# Patient Record
Sex: FEMALE | Race: WHITE | Employment: OTHER | URBAN - METROPOLITAN AREA
[De-identification: names, ages, dates, MRNs, and addresses within clinical notes are randomized per-mention and may not be internally consistent; named-entity substitution may affect disease eponyms.]

---

## 2023-12-01 ENCOUNTER — OFFICE VISIT (OUTPATIENT)
Dept: UROLOGY | Age: 88
End: 2023-12-01
Payer: COMMERCIAL

## 2023-12-01 DIAGNOSIS — R82.81 PYURIA: ICD-10-CM

## 2023-12-01 DIAGNOSIS — N90.89 LABIAL ADHESIONS: Primary | ICD-10-CM

## 2023-12-01 PROCEDURE — 99204 OFFICE O/P NEW MOD 45 MIN: CPT | Performed by: UROLOGY

## 2023-12-01 PROCEDURE — 1123F ACP DISCUSS/DSCN MKR DOCD: CPT | Performed by: UROLOGY

## 2023-12-01 RX ORDER — PREDNISONE 1 MG/1
TABLET ORAL
COMMUNITY
Start: 2023-11-09

## 2023-12-01 RX ORDER — ESTRADIOL 0.1 MG/G
1 CREAM VAGINAL
COMMUNITY
Start: 2023-10-09

## 2023-12-01 RX ORDER — LEVOTHYROXINE SODIUM 0.05 MG/1
50 TABLET ORAL DAILY
COMMUNITY
Start: 2023-11-09

## 2023-12-01 RX ORDER — ATORVASTATIN CALCIUM 20 MG/1
10 TABLET, FILM COATED ORAL
COMMUNITY

## 2023-12-01 ASSESSMENT — ENCOUNTER SYMPTOMS
VOMITING: 0
EYE PAIN: 0
SHORTNESS OF BREATH: 0
SKIN LESIONS: 0
BLOOD IN STOOL: 0
DIARRHEA: 0
NAUSEA: 0
CONSTIPATION: 0
INDIGESTION: 0
HEARTBURN: 0
COUGH: 0
WHEEZING: 0
ABDOMINAL PAIN: 0
EYE DISCHARGE: 0
BACK PAIN: 0

## 2023-12-01 NOTE — PROGRESS NOTES
[facility]  [address]  [phone]    Lorenzo Love  : 1929    Chief Complaint   Patient presents with    New Patient        HPI     Lorenzo Love is a 80 y.o. female  Referred by Dr. Monserrat Rogel for evaluation and treatment of recurrent UTI. Has seen Dr. Tere Alcala for labial adhesions. Treated with estrogen cream. She doesn't want to have to travel to [facility] to see him. She states that she is having too many UTI's. Sxs are fatigue. , she denies dysuria, hematuria, pain or fever. Takes Prednisone 1 mg daily. UA from  and 10-23 show pos nit, large lorenza. No past medical history on file. No past surgical history on file. Current Outpatient Medications   Medication Sig Dispense Refill    estradiol (ESTRACE) 0.1 MG/GM vaginal cream Place 1 g vaginally      atorvastatin (LIPITOR) 20 MG tablet Take 0.5 tablets by mouth      levothyroxine (SYNTHROID) 50 MCG tablet Take 1 tablet by mouth daily      predniSONE (DELTASONE) 1 MG tablet TAKE 1 TABLET (1 MG) BY MOUTH DAILY TAKE 1 TO 2 TABLETS BY MOUTH WITH FOOD FOR ARTHRITIS       No current facility-administered medications for this visit. No Known Allergies  Social History     Socioeconomic History    Marital status:       Spouse name: Not on file    Number of children: Not on file    Years of education: Not on file    Highest education level: Not on file   Occupational History    Not on file   Tobacco Use    Smoking status: Not on file    Smokeless tobacco: Not on file   Substance and Sexual Activity    Alcohol use: Not on file    Drug use: Not on file    Sexual activity: Not on file   Other Topics Concern    Not on file   Social History Narrative    Not on file     Social Determinants of Health     Financial Resource Strain: Not on file   Food Insecurity: Not on file   Transportation Needs: Not on file   Physical Activity: Not on file   Stress: Not on file

## 2023-12-03 LAB
BACTERIA SPEC CULT: ABNORMAL
SERVICE CMNT-IMP: ABNORMAL

## 2023-12-05 ENCOUNTER — TELEPHONE (OUTPATIENT)
Dept: UROLOGY | Age: 88
End: 2023-12-05

## 2023-12-05 DIAGNOSIS — N39.0 RECURRENT UTI: Primary | ICD-10-CM

## 2023-12-05 LAB
BACTERIA SPEC CULT: ABNORMAL
SERVICE CMNT-IMP: ABNORMAL

## 2023-12-05 RX ORDER — NITROFURANTOIN 25; 75 MG/1; MG/1
100 CAPSULE ORAL 2 TIMES DAILY
Qty: 6 CAPSULE | Refills: 0 | Status: SHIPPED | OUTPATIENT
Start: 2023-12-05 | End: 2023-12-08

## 2023-12-05 NOTE — TELEPHONE ENCOUNTER
Had UTI   Diagnosis Orders   1.  Recurrent UTI  nitrofurantoin, macrocrystal-monohydrate, (MACROBID) 100 MG capsule        I eprescribed the med

## 2024-09-02 ENCOUNTER — HOSPITAL ENCOUNTER (EMERGENCY)
Age: 89
Discharge: HOME | End: 2024-09-02
Payer: MEDICARE

## 2024-09-02 VITALS
DIASTOLIC BLOOD PRESSURE: 61 MMHG | WEIGHT: 110 LBS | RESPIRATION RATE: 16 BRPM | TEMPERATURE: 98 F | OXYGEN SATURATION: 98 % | SYSTOLIC BLOOD PRESSURE: 168 MMHG | HEART RATE: 76 BPM

## 2024-09-02 DIAGNOSIS — S81.811S: Primary | ICD-10-CM

## 2024-09-02 PROCEDURE — 99282 EMERGENCY DEPT VISIT SF MDM: CPT

## 2024-09-02 ASSESSMENT — ENCOUNTER SYMPTOMS
EYES NEGATIVE: 1
ALLERGIC/IMMUNOLOGIC NEGATIVE: 1
GASTROINTESTINAL NEGATIVE: 1
RESPIRATORY NEGATIVE: 1

## 2024-09-02 ASSESSMENT — PAIN SCALES - GENERAL: PAINLEVEL_OUTOF10: 1

## 2024-09-02 ASSESSMENT — PAIN DESCRIPTION - LOCATION: LOCATION: LEG

## 2024-09-03 NOTE — ED PROVIDER NOTES
Emergency Department Provider Note       PCP: Adalberto Harrison III, MD   Age: 89 y.o.   Sex: female     DISPOSITION Decision To Discharge 09/02/2024 09:10:10 PM  Condition at Disposition: Good       ICD-10-CM    1. Noninfected skin tear of right leg, sequela  S81.811S           Medical Decision Making     In summary this is a well-appearing 89-year-old female who arrives for skin tear to the anterior right thigh that occurred prior to arrival.  Upon trying to approximate for possible Steri-Strip or suturing, patient's skin tore so feel that no closure approximation of skin is possible at this time.  I have placed a nonstick bulky dressing on at this time.  Wound care was discussed with patient and daughter at bedside in which they verbalized understanding.  Will have him follow-up with her PCP in a couple of weeks for reassessment and reevaluation of this.  Signs of infection were discussed as well as other complications in which they need to return.  They verbalized understanding of this.  Very strict return precautions were discussed with patient and daughter in which they verbalized understanding.     1 acute, uncomplicated illness or injury.  Patient was discharged risks and benefits of hospitalization were considered.  Shared medical decision making was utilized in creating the patients health plan today.    I independently ordered and reviewed each unique test.  I reviewed external records: ED visit note from an outside group.  I reviewed external records: provider visit note from PCP.    history provided by patient and daughter.                  History     This is a generally healthy appearing 89-year-old female arriving to the emergency department for skin tear to the anterior distal thigh that occurred prior to arrival.  Daughter states that she tripped over a dog and fell onto her mother.  Daughter reports that the skin tear was caused by her hand running up the thigh as patient did not hit it on

## 2024-09-03 NOTE — DISCHARGE INSTRUCTIONS
As we discussed, please keep wound clean and dry until tomorrow.  As we discussed, monitor for signs of infection.  Please perform dressing changes as we discussed.  Which you to follow-up with your primary care physician in a couple weeks for recheck and further recommendations if this is not healed completely.  As we discussed, please return to the emergency department for any new, worsening, concerning symptoms.

## 2024-09-03 NOTE — ED NOTES
Patient mobility status  with mild difficulty. Provider aware     I have reviewed discharge instructions with the patient.  The patient verbalized understanding.    Patient left ED via Discharge Method: ambulatory to Home with Child.    Opportunity for questions and clarification provided.     Patient given 0 scripts.

## 2024-11-14 ENCOUNTER — APPOINTMENT (OUTPATIENT)
Dept: GENERAL RADIOLOGY | Age: 89
End: 2024-11-14
Payer: MEDICARE

## 2024-11-14 ENCOUNTER — HOSPITAL ENCOUNTER (OUTPATIENT)
Age: 89
Setting detail: OBSERVATION
Discharge: HOME | End: 2024-11-16
Attending: EMERGENCY MEDICINE | Admitting: STUDENT IN AN ORGANIZED HEALTH CARE EDUCATION/TRAINING PROGRAM
Payer: MEDICARE

## 2024-11-14 DIAGNOSIS — I24.9 ACUTE CORONARY SYNDROME (HCC): ICD-10-CM

## 2024-11-14 DIAGNOSIS — I24.9 ACS (ACUTE CORONARY SYNDROME) (HCC): Primary | ICD-10-CM

## 2024-11-14 DIAGNOSIS — R79.89 ELEVATED TROPONIN: ICD-10-CM

## 2024-11-14 DIAGNOSIS — R55 SYNCOPE, UNSPECIFIED SYNCOPE TYPE: ICD-10-CM

## 2024-11-14 PROBLEM — Z51.5 ENCOUNTER FOR PALLIATIVE CARE: Status: ACTIVE | Noted: 2024-11-14

## 2024-11-14 PROBLEM — M19.90 OSTEOARTHRITIS: Status: ACTIVE | Noted: 2024-11-14

## 2024-11-14 PROBLEM — R07.9 CHEST PAIN: Status: ACTIVE | Noted: 2024-11-14

## 2024-11-14 PROBLEM — I21.4 NSTEMI (NON-ST ELEVATED MYOCARDIAL INFARCTION) (HCC): Status: ACTIVE | Noted: 2024-11-14

## 2024-11-14 PROBLEM — E03.9 HYPOTHYROIDISM: Status: ACTIVE | Noted: 2024-11-14

## 2024-11-14 PROBLEM — N18.9 CKD (CHRONIC KIDNEY DISEASE): Status: ACTIVE | Noted: 2024-11-14

## 2024-11-14 PROBLEM — Z87.39 HISTORY OF GIANT CELL ARTERITIS: Status: ACTIVE | Noted: 2024-11-14

## 2024-11-14 PROBLEM — Z87.39 HISTORY OF POLYMYALGIA RHEUMATICA: Status: ACTIVE | Noted: 2024-11-14

## 2024-11-14 PROBLEM — I25.10 CAD (CORONARY ARTERY DISEASE): Status: ACTIVE | Noted: 2024-11-14

## 2024-11-14 PROBLEM — M54.2 CHRONIC NECK PAIN: Status: ACTIVE | Noted: 2024-11-14

## 2024-11-14 PROBLEM — M81.0 OSTEOPOROSIS: Status: ACTIVE | Noted: 2024-11-14

## 2024-11-14 PROBLEM — Z95.5 S/P CORONARY ARTERY STENT PLACEMENT: Status: ACTIVE | Noted: 2024-11-14

## 2024-11-14 PROBLEM — G89.29 CHRONIC NECK PAIN: Status: ACTIVE | Noted: 2024-11-14

## 2024-11-14 LAB
ALBUMIN SERPL-MCNC: 3.6 G/DL (ref 3.2–4.6)
ALBUMIN/GLOB SERPL: 1 (ref 1–1.9)
ALP SERPL-CCNC: 105 U/L (ref 35–104)
ALT SERPL-CCNC: 10 U/L (ref 8–45)
ANION GAP SERPL CALC-SCNC: 15 MMOL/L (ref 7–16)
APTT PPP: 25 SEC (ref 23.3–37.4)
AST SERPL-CCNC: 25 U/L (ref 15–37)
BASOPHILS # BLD: 0 K/UL (ref 0–0.2)
BASOPHILS NFR BLD: 1 % (ref 0–2)
BILIRUB SERPL-MCNC: 0.4 MG/DL (ref 0–1.2)
BUN SERPL-MCNC: 23 MG/DL (ref 8–23)
CALCIUM SERPL-MCNC: 9.7 MG/DL (ref 8.8–10.2)
CHLORIDE SERPL-SCNC: 105 MMOL/L (ref 98–107)
CO2 SERPL-SCNC: 20 MMOL/L (ref 20–29)
CREAT SERPL-MCNC: 1.41 MG/DL (ref 0.6–1.1)
DIFFERENTIAL METHOD BLD: NORMAL
EOSINOPHIL # BLD: 0.1 K/UL (ref 0–0.8)
EOSINOPHIL NFR BLD: 1 % (ref 0.5–7.8)
ERYTHROCYTE [DISTWIDTH] IN BLOOD BY AUTOMATED COUNT: 13.2 % (ref 11.9–14.6)
GLOBULIN SER CALC-MCNC: 3.7 G/DL (ref 2.3–3.5)
GLUCOSE SERPL-MCNC: 109 MG/DL (ref 70–99)
HCT VFR BLD AUTO: 41.5 % (ref 35.8–46.3)
HGB BLD-MCNC: 13.7 G/DL (ref 11.7–15.4)
IMM GRANULOCYTES # BLD AUTO: 0 K/UL (ref 0–0.5)
IMM GRANULOCYTES NFR BLD AUTO: 0 % (ref 0–5)
INR PPP: 1
LYMPHOCYTES # BLD: 1.1 K/UL (ref 0.5–4.6)
LYMPHOCYTES NFR BLD: 14 % (ref 13–44)
MCH RBC QN AUTO: 30.5 PG (ref 26.1–32.9)
MCHC RBC AUTO-ENTMCNC: 33 G/DL (ref 31.4–35)
MCV RBC AUTO: 92.4 FL (ref 82–102)
MONOCYTES # BLD: 0.6 K/UL (ref 0.1–1.3)
MONOCYTES NFR BLD: 8 % (ref 4–12)
NEUTS SEG # BLD: 6.1 K/UL (ref 1.7–8.2)
NEUTS SEG NFR BLD: 76 % (ref 43–78)
NRBC # BLD: 0 K/UL (ref 0–0.2)
NT PRO BNP: 1424 PG/ML (ref 0–450)
PLATELET # BLD AUTO: 220 K/UL (ref 150–450)
PMV BLD AUTO: 12.3 FL (ref 9.4–12.3)
POTASSIUM SERPL-SCNC: 4.5 MMOL/L (ref 3.5–5.1)
PROT SERPL-MCNC: 7.3 G/DL (ref 6.3–8.2)
PROTHROMBIN TIME: 13.5 SEC (ref 11.3–14.9)
RBC # BLD AUTO: 4.49 M/UL (ref 4.05–5.2)
SODIUM SERPL-SCNC: 141 MMOL/L (ref 136–145)
TROPONIN T SERPL HS-MCNC: 175 NG/L (ref 0–14)
TROPONIN T SERPL HS-MCNC: 202 NG/L (ref 0–14)
UFH PPP CHRO-ACNC: <0.1 IU/ML (ref 0.3–0.7)
WBC # BLD AUTO: 8 K/UL (ref 4.3–11.1)

## 2024-11-14 PROCEDURE — 83880 ASSAY OF NATRIURETIC PEPTIDE: CPT

## 2024-11-14 PROCEDURE — 85520 HEPARIN ASSAY: CPT

## 2024-11-14 PROCEDURE — 2580000003 HC RX 258: Performed by: EMERGENCY MEDICINE

## 2024-11-14 PROCEDURE — 80053 COMPREHEN METABOLIC PANEL: CPT

## 2024-11-14 PROCEDURE — G0378 HOSPITAL OBSERVATION PER HR: HCPCS

## 2024-11-14 PROCEDURE — 99285 EMERGENCY DEPT VISIT HI MDM: CPT

## 2024-11-14 PROCEDURE — 96375 TX/PRO/DX INJ NEW DRUG ADDON: CPT

## 2024-11-14 PROCEDURE — 6370000000 HC RX 637 (ALT 250 FOR IP): Performed by: EMERGENCY MEDICINE

## 2024-11-14 PROCEDURE — 72050 X-RAY EXAM NECK SPINE 4/5VWS: CPT

## 2024-11-14 PROCEDURE — 96365 THER/PROPH/DIAG IV INF INIT: CPT

## 2024-11-14 PROCEDURE — 85730 THROMBOPLASTIN TIME PARTIAL: CPT

## 2024-11-14 PROCEDURE — 71045 X-RAY EXAM CHEST 1 VIEW: CPT

## 2024-11-14 PROCEDURE — 85610 PROTHROMBIN TIME: CPT

## 2024-11-14 PROCEDURE — 84484 ASSAY OF TROPONIN QUANT: CPT

## 2024-11-14 PROCEDURE — 6360000002 HC RX W HCPCS: Performed by: EMERGENCY MEDICINE

## 2024-11-14 PROCEDURE — 85025 COMPLETE CBC W/AUTO DIFF WBC: CPT

## 2024-11-14 PROCEDURE — 93005 ELECTROCARDIOGRAM TRACING: CPT | Performed by: EMERGENCY MEDICINE

## 2024-11-14 RX ORDER — ACETAMINOPHEN 650 MG/1
650 SUPPOSITORY RECTAL EVERY 6 HOURS PRN
Status: DISCONTINUED | OUTPATIENT
Start: 2024-11-14 | End: 2024-11-14 | Stop reason: SDUPTHER

## 2024-11-14 RX ORDER — ONDANSETRON 2 MG/ML
4 INJECTION INTRAMUSCULAR; INTRAVENOUS ONCE
Status: COMPLETED | OUTPATIENT
Start: 2024-11-14 | End: 2024-11-14

## 2024-11-14 RX ORDER — ACETAMINOPHEN 325 MG/1
650 TABLET ORAL EVERY 4 HOURS PRN
Status: DISCONTINUED | OUTPATIENT
Start: 2024-11-14 | End: 2024-11-16 | Stop reason: HOSPADM

## 2024-11-14 RX ORDER — HEPARIN SODIUM 10000 [USP'U]/100ML
5-30 INJECTION, SOLUTION INTRAVENOUS CONTINUOUS
Status: DISCONTINUED | OUTPATIENT
Start: 2024-11-14 | End: 2024-11-15

## 2024-11-14 RX ORDER — MAGNESIUM SULFATE IN WATER 40 MG/ML
2000 INJECTION, SOLUTION INTRAVENOUS PRN
Status: DISCONTINUED | OUTPATIENT
Start: 2024-11-14 | End: 2024-11-16 | Stop reason: HOSPADM

## 2024-11-14 RX ORDER — ENOXAPARIN SODIUM 100 MG/ML
30 INJECTION SUBCUTANEOUS DAILY
Status: CANCELLED | OUTPATIENT
Start: 2024-11-15

## 2024-11-14 RX ORDER — SODIUM CHLORIDE 9 MG/ML
INJECTION, SOLUTION INTRAVENOUS PRN
Status: DISCONTINUED | OUTPATIENT
Start: 2024-11-14 | End: 2024-11-16 | Stop reason: HOSPADM

## 2024-11-14 RX ORDER — HEPARIN SODIUM 1000 [USP'U]/ML
30 INJECTION, SOLUTION INTRAVENOUS; SUBCUTANEOUS PRN
Status: DISCONTINUED | OUTPATIENT
Start: 2024-11-14 | End: 2024-11-15

## 2024-11-14 RX ORDER — POLYETHYLENE GLYCOL 3350 17 G/17G
17 POWDER, FOR SOLUTION ORAL DAILY PRN
Status: DISCONTINUED | OUTPATIENT
Start: 2024-11-14 | End: 2024-11-16 | Stop reason: HOSPADM

## 2024-11-14 RX ORDER — ASPIRIN 81 MG/1
324 TABLET, CHEWABLE ORAL ONCE
Status: COMPLETED | OUTPATIENT
Start: 2024-11-14 | End: 2024-11-14

## 2024-11-14 RX ORDER — 0.9 % SODIUM CHLORIDE 0.9 %
500 INTRAVENOUS SOLUTION INTRAVENOUS ONCE
Status: COMPLETED | OUTPATIENT
Start: 2024-11-14 | End: 2024-11-14

## 2024-11-14 RX ORDER — POTASSIUM CHLORIDE 1500 MG/1
40 TABLET, EXTENDED RELEASE ORAL PRN
Status: DISCONTINUED | OUTPATIENT
Start: 2024-11-14 | End: 2024-11-16 | Stop reason: HOSPADM

## 2024-11-14 RX ORDER — SODIUM CHLORIDE 0.9 % (FLUSH) 0.9 %
5-40 SYRINGE (ML) INJECTION EVERY 12 HOURS SCHEDULED
Status: DISCONTINUED | OUTPATIENT
Start: 2024-11-14 | End: 2024-11-16 | Stop reason: HOSPADM

## 2024-11-14 RX ORDER — HEPARIN SODIUM 1000 [USP'U]/ML
60 INJECTION, SOLUTION INTRAVENOUS; SUBCUTANEOUS ONCE
Status: COMPLETED | OUTPATIENT
Start: 2024-11-14 | End: 2024-11-14

## 2024-11-14 RX ORDER — HEPARIN SODIUM 1000 [USP'U]/ML
60 INJECTION, SOLUTION INTRAVENOUS; SUBCUTANEOUS PRN
Status: DISCONTINUED | OUTPATIENT
Start: 2024-11-14 | End: 2024-11-15

## 2024-11-14 RX ORDER — SODIUM CHLORIDE 0.9 % (FLUSH) 0.9 %
5-40 SYRINGE (ML) INJECTION PRN
Status: DISCONTINUED | OUTPATIENT
Start: 2024-11-14 | End: 2024-11-16 | Stop reason: HOSPADM

## 2024-11-14 RX ORDER — ATORVASTATIN CALCIUM 10 MG/1
10 TABLET, FILM COATED ORAL NIGHTLY
Status: DISCONTINUED | OUTPATIENT
Start: 2024-11-14 | End: 2024-11-16 | Stop reason: HOSPADM

## 2024-11-14 RX ORDER — POTASSIUM CHLORIDE 7.45 MG/ML
10 INJECTION INTRAVENOUS PRN
Status: DISCONTINUED | OUTPATIENT
Start: 2024-11-14 | End: 2024-11-16 | Stop reason: HOSPADM

## 2024-11-14 RX ORDER — PREDNISONE 1 MG/1
1 TABLET ORAL DAILY
Status: DISCONTINUED | OUTPATIENT
Start: 2024-11-15 | End: 2024-11-16 | Stop reason: HOSPADM

## 2024-11-14 RX ORDER — ACETAMINOPHEN 325 MG/1
650 TABLET ORAL EVERY 6 HOURS PRN
Status: DISCONTINUED | OUTPATIENT
Start: 2024-11-14 | End: 2024-11-14 | Stop reason: SDUPTHER

## 2024-11-14 RX ORDER — ONDANSETRON 2 MG/ML
4 INJECTION INTRAMUSCULAR; INTRAVENOUS EVERY 6 HOURS PRN
Status: DISCONTINUED | OUTPATIENT
Start: 2024-11-14 | End: 2024-11-16 | Stop reason: HOSPADM

## 2024-11-14 RX ORDER — ONDANSETRON 4 MG/1
4 TABLET, ORALLY DISINTEGRATING ORAL EVERY 8 HOURS PRN
Status: DISCONTINUED | OUTPATIENT
Start: 2024-11-14 | End: 2024-11-16 | Stop reason: HOSPADM

## 2024-11-14 RX ORDER — HYDRALAZINE HYDROCHLORIDE 20 MG/ML
10 INJECTION INTRAMUSCULAR; INTRAVENOUS EVERY 6 HOURS PRN
Status: DISCONTINUED | OUTPATIENT
Start: 2024-11-14 | End: 2024-11-16 | Stop reason: HOSPADM

## 2024-11-14 RX ORDER — LEVOTHYROXINE SODIUM 50 UG/1
50 TABLET ORAL
Status: DISCONTINUED | OUTPATIENT
Start: 2024-11-15 | End: 2024-11-16 | Stop reason: HOSPADM

## 2024-11-14 RX ORDER — ACETAMINOPHEN 650 MG/1
650 SUPPOSITORY RECTAL EVERY 4 HOURS PRN
Status: DISCONTINUED | OUTPATIENT
Start: 2024-11-14 | End: 2024-11-16 | Stop reason: HOSPADM

## 2024-11-14 RX ADMIN — ASPIRIN 324 MG: 81 TABLET, CHEWABLE ORAL at 23:33

## 2024-11-14 RX ADMIN — ONDANSETRON 4 MG: 2 INJECTION INTRAMUSCULAR; INTRAVENOUS at 22:39

## 2024-11-14 RX ADMIN — PANTOPRAZOLE SODIUM 40 MG: 40 INJECTION, POWDER, FOR SOLUTION INTRAVENOUS at 22:39

## 2024-11-14 RX ADMIN — HEPARIN SODIUM 12 UNITS/KG/HR: 10000 INJECTION, SOLUTION INTRAVENOUS at 22:56

## 2024-11-14 RX ADMIN — HEPARIN SODIUM 3000 UNITS: 1000 INJECTION INTRAVENOUS; SUBCUTANEOUS at 22:38

## 2024-11-14 RX ADMIN — SODIUM CHLORIDE 500 ML: 9 INJECTION, SOLUTION INTRAVENOUS at 22:37

## 2024-11-14 ASSESSMENT — PAIN SCALES - GENERAL: PAINLEVEL_OUTOF10: 10

## 2024-11-14 ASSESSMENT — PAIN - FUNCTIONAL ASSESSMENT: PAIN_FUNCTIONAL_ASSESSMENT: 0-10

## 2024-11-15 ENCOUNTER — APPOINTMENT (OUTPATIENT)
Dept: NON INVASIVE DIAGNOSTICS | Age: 89
End: 2024-11-15
Attending: STUDENT IN AN ORGANIZED HEALTH CARE EDUCATION/TRAINING PROGRAM
Payer: MEDICARE

## 2024-11-15 PROBLEM — Z71.89 ACP (ADVANCE CARE PLANNING): Status: ACTIVE | Noted: 2024-11-15

## 2024-11-15 PROBLEM — R53.83 FATIGUE: Status: ACTIVE | Noted: 2024-11-15

## 2024-11-15 LAB
ALBUMIN SERPL-MCNC: 2.9 G/DL (ref 3.2–4.6)
ALBUMIN/GLOB SERPL: 1 (ref 1–1.9)
ALP SERPL-CCNC: 85 U/L (ref 35–104)
ALT SERPL-CCNC: 11 U/L (ref 8–45)
ANION GAP SERPL CALC-SCNC: 9 MMOL/L (ref 7–16)
APPEARANCE UR: ABNORMAL
AST SERPL-CCNC: 20 U/L (ref 15–37)
BACTERIA URNS QL MICRO: ABNORMAL /HPF
BASOPHILS # BLD: 0 K/UL (ref 0–0.2)
BASOPHILS NFR BLD: 1 % (ref 0–2)
BILIRUB SERPL-MCNC: 0.3 MG/DL (ref 0–1.2)
BILIRUB UR QL: NEGATIVE
BUN SERPL-MCNC: 25 MG/DL (ref 8–23)
CALCIUM SERPL-MCNC: 8.2 MG/DL (ref 8.8–10.2)
CHLORIDE SERPL-SCNC: 109 MMOL/L (ref 98–107)
CO2 SERPL-SCNC: 23 MMOL/L (ref 20–29)
COLOR UR: ABNORMAL
CREAT SERPL-MCNC: 1.59 MG/DL (ref 0.6–1.1)
DIFFERENTIAL METHOD BLD: ABNORMAL
ECHO AO ASC DIAM: 3.1 CM
ECHO AO ASCENDING AORTA INDEX: 2.04 CM/M2
ECHO AO ROOT DIAM: 3.2 CM
ECHO AO ROOT INDEX: 2.11 CM/M2
ECHO AR MAX VEL PISA: 3.8 M/S
ECHO AV AREA PEAK VELOCITY: 1.9 CM2
ECHO AV AREA VTI: 1.8 CM2
ECHO AV AREA/BSA PEAK VELOCITY: 1.3 CM2/M2
ECHO AV AREA/BSA VTI: 1.2 CM2/M2
ECHO AV MEAN GRADIENT: 5 MMHG
ECHO AV MEAN GRADIENT: 5 MMHG
ECHO AV MEAN VELOCITY: 1 M/S
ECHO AV PEAK GRADIENT: 9 MMHG
ECHO AV PEAK VELOCITY: 1.5 M/S
ECHO AV REGURGITANT PHT: 459 MS
ECHO AV VELOCITY RATIO: 0.73
ECHO AV VTI: 39.4 CM
ECHO BSA: 1.5 M2
ECHO EST RA PRESSURE: 8 MMHG
ECHO IVC PROX: 1.8 CM
ECHO LA AREA 2C: 20.4 CM2
ECHO LA AREA 4C: 16 CM2
ECHO LA DIAMETER INDEX: 1.97 CM/M2
ECHO LA DIAMETER: 3 CM
ECHO LA MAJOR AXIS: 5.8 CM
ECHO LA MINOR AXIS: 5.9 CM
ECHO LA TO AORTIC ROOT RATIO: 0.94
ECHO LA VOL BP: 45 ML (ref 22–52)
ECHO LA VOL MOD A2C: 56 ML (ref 22–52)
ECHO LA VOL MOD A4C: 35 ML (ref 22–52)
ECHO LA VOL/BSA BIPLANE: 30 ML/M2 (ref 16–34)
ECHO LA VOLUME INDEX MOD A2C: 37 ML/M2 (ref 16–34)
ECHO LA VOLUME INDEX MOD A4C: 23 ML/M2 (ref 16–34)
ECHO LV E' LATERAL VELOCITY: 8.59 CM/S
ECHO LV E' SEPTAL VELOCITY: 6.31 CM/S
ECHO LV EDV A2C: 66 ML
ECHO LV EDV A4C: 71 ML
ECHO LV EDV INDEX A4C: 47 ML/M2
ECHO LV EDV NDEX A2C: 43 ML/M2
ECHO LV EJECTION FRACTION A2C: 52 %
ECHO LV EJECTION FRACTION A4C: 64 %
ECHO LV EJECTION FRACTION BIPLANE: 59 % (ref 55–100)
ECHO LV ESV A2C: 32 ML
ECHO LV ESV A4C: 26 ML
ECHO LV ESV INDEX A2C: 21 ML/M2
ECHO LV ESV INDEX A4C: 17 ML/M2
ECHO LV FRACTIONAL SHORTENING: 27 % (ref 28–44)
ECHO LV INTERNAL DIMENSION DIASTOLE INDEX: 3.22 CM/M2
ECHO LV INTERNAL DIMENSION DIASTOLIC: 4.9 CM (ref 3.9–5.3)
ECHO LV INTERNAL DIMENSION SYSTOLIC INDEX: 2.37 CM/M2
ECHO LV INTERNAL DIMENSION SYSTOLIC: 3.6 CM
ECHO LV IVSD: 0.8 CM (ref 0.6–0.9)
ECHO LV MASS 2D: 131.2 G (ref 67–162)
ECHO LV MASS INDEX 2D: 86.3 G/M2 (ref 43–95)
ECHO LV POSTERIOR WALL DIASTOLIC: 0.8 CM (ref 0.6–0.9)
ECHO LV RELATIVE WALL THICKNESS RATIO: 0.33
ECHO LVOT AREA: 2.5 CM2
ECHO LVOT AV VTI INDEX: 0.71
ECHO LVOT DIAM: 1.8 CM
ECHO LVOT MEAN GRADIENT: 2 MMHG
ECHO LVOT PEAK GRADIENT: 5 MMHG
ECHO LVOT PEAK VELOCITY: 1.1 M/S
ECHO LVOT STROKE VOLUME INDEX: 46.9 ML/M2
ECHO LVOT SV: 71.2 ML
ECHO LVOT VTI: 28 CM
ECHO MV A VELOCITY: 1.54 M/S
ECHO MV AREA VTI: 1.6 CM2
ECHO MV E DECELERATION TIME (DT): 330 MS
ECHO MV E VELOCITY: 0.86 M/S
ECHO MV E/A RATIO: 0.56
ECHO MV E/E' LATERAL: 10.01
ECHO MV E/E' RATIO (AVERAGED): 11.82
ECHO MV E/E' SEPTAL: 13.63
ECHO MV LVOT VTI INDEX: 1.59
ECHO MV MAX VELOCITY: 1.6 M/S
ECHO MV MEAN GRADIENT: 3 MMHG
ECHO MV MEAN VELOCITY: 0.7 M/S
ECHO MV PEAK GRADIENT: 10 MMHG
ECHO MV VTI: 44.4 CM
ECHO PV ACCELERATION TIME (AT): 116 MS
ECHO PV MAX VELOCITY: 0.8 M/S
ECHO PV PEAK GRADIENT: 3 MMHG
ECHO RIGHT VENTRICULAR SYSTOLIC PRESSURE (RVSP): 35 MMHG
ECHO RV BASAL DIMENSION: 3.3 CM
ECHO RV FREE WALL PEAK S': 12.4 CM/S
ECHO RV INTERNAL DIMENSION: 2.5 CM
ECHO RV TAPSE: 1.6 CM (ref 1.7–?)
ECHO TV REGURGITANT MAX VELOCITY: 2.59 M/S
ECHO TV REGURGITANT PEAK GRADIENT: 27 MMHG
EKG ATRIAL RATE: 62 BPM
EKG DIAGNOSIS: NORMAL
EKG P AXIS: 81 DEGREES
EKG P-R INTERVAL: 157 MS
EKG Q-T INTERVAL: 434 MS
EKG QRS DURATION: 105 MS
EKG QTC CALCULATION (BAZETT): 441 MS
EKG R AXIS: 59 DEGREES
EKG T AXIS: 59 DEGREES
EKG VENTRICULAR RATE: 62 BPM
EOSINOPHIL # BLD: 0.1 K/UL (ref 0–0.8)
EOSINOPHIL NFR BLD: 2 % (ref 0.5–7.8)
EPI CELLS #/AREA URNS HPF: ABNORMAL /HPF
ERYTHROCYTE [DISTWIDTH] IN BLOOD BY AUTOMATED COUNT: 12.7 % (ref 11.9–14.6)
GLOBULIN SER CALC-MCNC: 3 G/DL (ref 2.3–3.5)
GLUCOSE SERPL-MCNC: 110 MG/DL (ref 70–99)
GLUCOSE UR STRIP.AUTO-MCNC: NEGATIVE MG/DL
HCT VFR BLD AUTO: 34.7 % (ref 35.8–46.3)
HGB BLD-MCNC: 11.5 G/DL (ref 11.7–15.4)
HGB UR QL STRIP: NEGATIVE
IMM GRANULOCYTES # BLD AUTO: 0 K/UL (ref 0–0.5)
IMM GRANULOCYTES NFR BLD AUTO: 0 % (ref 0–5)
KETONES UR QL STRIP.AUTO: ABNORMAL MG/DL
LEUKOCYTE ESTERASE UR QL STRIP.AUTO: ABNORMAL
LYMPHOCYTES # BLD: 1.8 K/UL (ref 0.5–4.6)
LYMPHOCYTES NFR BLD: 31 % (ref 13–44)
MCH RBC QN AUTO: 30 PG (ref 26.1–32.9)
MCHC RBC AUTO-ENTMCNC: 33.1 G/DL (ref 31.4–35)
MCV RBC AUTO: 90.6 FL (ref 82–102)
MONOCYTES # BLD: 0.5 K/UL (ref 0.1–1.3)
MONOCYTES NFR BLD: 9 % (ref 4–12)
MUCOUS THREADS URNS QL MICRO: 0 /LPF
NEUTS SEG # BLD: 3.3 K/UL (ref 1.7–8.2)
NEUTS SEG NFR BLD: 57 % (ref 43–78)
NITRITE UR QL STRIP.AUTO: NEGATIVE
NRBC # BLD: 0 K/UL (ref 0–0.2)
OTHER OBSERVATIONS: ABNORMAL
PH UR STRIP: 6.5 (ref 5–9)
PLATELET # BLD AUTO: 174 K/UL (ref 150–450)
PMV BLD AUTO: 12.4 FL (ref 9.4–12.3)
POTASSIUM SERPL-SCNC: 4.5 MMOL/L (ref 3.5–5.1)
PROT SERPL-MCNC: 5.9 G/DL (ref 6.3–8.2)
PROT UR STRIP-MCNC: NEGATIVE MG/DL
RBC # BLD AUTO: 3.83 M/UL (ref 4.05–5.2)
RBC #/AREA URNS HPF: ABNORMAL /HPF
SODIUM SERPL-SCNC: 141 MMOL/L (ref 136–145)
SP GR UR REFRACTOMETRY: 1.02 (ref 1–1.02)
UFH PPP CHRO-ACNC: 0.24 IU/ML (ref 0.3–0.7)
UFH PPP CHRO-ACNC: 0.32 IU/ML (ref 0.3–0.7)
UFH PPP CHRO-ACNC: >1.1 IU/ML (ref 0.3–0.7)
URINE CULTURE IF INDICATED: ABNORMAL
UROBILINOGEN UR QL STRIP.AUTO: 1 EU/DL (ref 0.2–1)
WBC # BLD AUTO: 5.7 K/UL (ref 4.3–11.1)
WBC URNS QL MICRO: ABNORMAL /HPF

## 2024-11-15 PROCEDURE — 81001 URINALYSIS AUTO W/SCOPE: CPT

## 2024-11-15 PROCEDURE — G0378 HOSPITAL OBSERVATION PER HR: HCPCS

## 2024-11-15 PROCEDURE — 97530 THERAPEUTIC ACTIVITIES: CPT

## 2024-11-15 PROCEDURE — 97161 PT EVAL LOW COMPLEX 20 MIN: CPT

## 2024-11-15 PROCEDURE — 85520 HEPARIN ASSAY: CPT

## 2024-11-15 PROCEDURE — 99203 OFFICE O/P NEW LOW 30 MIN: CPT | Performed by: INTERNAL MEDICINE

## 2024-11-15 PROCEDURE — 80053 COMPREHEN METABOLIC PANEL: CPT

## 2024-11-15 PROCEDURE — 36415 COLL VENOUS BLD VENIPUNCTURE: CPT

## 2024-11-15 PROCEDURE — 6370000000 HC RX 637 (ALT 250 FOR IP): Performed by: STUDENT IN AN ORGANIZED HEALTH CARE EDUCATION/TRAINING PROGRAM

## 2024-11-15 PROCEDURE — 99497 ADVNCD CARE PLAN 30 MIN: CPT | Performed by: NURSE PRACTITIONER

## 2024-11-15 PROCEDURE — 93010 ELECTROCARDIOGRAM REPORT: CPT | Performed by: INTERNAL MEDICINE

## 2024-11-15 PROCEDURE — 96366 THER/PROPH/DIAG IV INF ADDON: CPT

## 2024-11-15 PROCEDURE — 93306 TTE W/DOPPLER COMPLETE: CPT

## 2024-11-15 PROCEDURE — 6360000002 HC RX W HCPCS: Performed by: EMERGENCY MEDICINE

## 2024-11-15 PROCEDURE — 97165 OT EVAL LOW COMPLEX 30 MIN: CPT

## 2024-11-15 PROCEDURE — 93306 TTE W/DOPPLER COMPLETE: CPT | Performed by: INTERNAL MEDICINE

## 2024-11-15 PROCEDURE — 85025 COMPLETE CBC W/AUTO DIFF WBC: CPT

## 2024-11-15 PROCEDURE — 99221 1ST HOSP IP/OBS SF/LOW 40: CPT | Performed by: NURSE PRACTITIONER

## 2024-11-15 PROCEDURE — 97535 SELF CARE MNGMENT TRAINING: CPT

## 2024-11-15 PROCEDURE — 2580000003 HC RX 258: Performed by: STUDENT IN AN ORGANIZED HEALTH CARE EDUCATION/TRAINING PROGRAM

## 2024-11-15 RX ORDER — ASPIRIN 81 MG/1
81 TABLET, CHEWABLE ORAL DAILY
COMMUNITY

## 2024-11-15 RX ORDER — CALCIUM CARBONATE 500(1250)
500 TABLET ORAL DAILY
COMMUNITY

## 2024-11-15 RX ORDER — MULTIVIT-MIN/IRON/FOLIC ACID/K 18-600-40
2000 CAPSULE ORAL DAILY
COMMUNITY

## 2024-11-15 RX ADMIN — SODIUM CHLORIDE, PRESERVATIVE FREE 10 ML: 5 INJECTION INTRAVENOUS at 21:43

## 2024-11-15 RX ADMIN — ATORVASTATIN CALCIUM 10 MG: 10 TABLET, FILM COATED ORAL at 00:51

## 2024-11-15 RX ADMIN — SODIUM CHLORIDE, PRESERVATIVE FREE 10 ML: 5 INJECTION INTRAVENOUS at 02:00

## 2024-11-15 RX ADMIN — LEVOTHYROXINE SODIUM 50 MCG: 0.05 TABLET ORAL at 05:49

## 2024-11-15 RX ADMIN — SODIUM CHLORIDE, PRESERVATIVE FREE 10 ML: 5 INJECTION INTRAVENOUS at 08:26

## 2024-11-15 RX ADMIN — PREDNISONE 1 MG: 1 TABLET ORAL at 08:26

## 2024-11-15 RX ADMIN — HEPARIN SODIUM 1500 UNITS: 1000 INJECTION INTRAVENOUS; SUBCUTANEOUS at 06:35

## 2024-11-15 RX ADMIN — ATORVASTATIN CALCIUM 10 MG: 10 TABLET, FILM COATED ORAL at 21:43

## 2024-11-15 NOTE — H&P
Comprehensive Metabolic Panel w/ Reflex to MG    Collection Time: 11/14/24  9:15 PM   Result Value Ref Range    Sodium 141 136 - 145 mmol/L    Potassium 4.5 3.5 - 5.1 mmol/L    Chloride 105 98 - 107 mmol/L    CO2 20 20 - 29 mmol/L    Anion Gap 15 7 - 16 mmol/L    Glucose 109 (H) 70 - 99 mg/dL    BUN 23 8 - 23 MG/DL    Creatinine 1.41 (H) 0.60 - 1.10 MG/DL    Est, Glom Filt Rate 34 (L) >60 ml/min/1.73m2    Calcium 9.7 8.8 - 10.2 MG/DL    Total Bilirubin 0.4 0.0 - 1.2 MG/DL    ALT 10 8 - 45 U/L    AST 25 15 - 37 U/L    Alk Phosphatase 105 (H) 35 - 104 U/L    Total Protein 7.3 6.3 - 8.2 g/dL    Albumin 3.6 3.2 - 4.6 g/dL    Globulin 3.7 (H) 2.3 - 3.5 g/dL    Albumin/Globulin Ratio 1.0 1.0 - 1.9     Troponin    Collection Time: 11/14/24  9:15 PM   Result Value Ref Range    Troponin T 202.0 (HH) 0 - 14 ng/L   Brain Natriuretic Peptide    Collection Time: 11/14/24  9:15 PM   Result Value Ref Range    NT Pro-BNP 1,424 (H) 0 - 450 PG/ML   Troponin now then q90 min for 2 occurances    Collection Time: 11/14/24 10:22 PM   Result Value Ref Range    Troponin T 175.0 (HH) 0 - 14 ng/L   APTT    Collection Time: 11/14/24 10:22 PM   Result Value Ref Range    APTT 25.0 23.3 - 37.4 SEC   Protime-INR    Collection Time: 11/14/24 10:22 PM   Result Value Ref Range    Protime 13.5 11.3 - 14.9 sec    INR 1.0     Anti-Xa, Unfractionated Heparin    Collection Time: 11/14/24 10:22 PM   Result Value Ref Range    Anti-XA Unfrac Heparin <0.1 (L) 0.3 - 0.7 IU/mL       No results for input(s): \"COVID19\" in the last 72 hours.    XR CHEST PORTABLE    Result Date: 11/14/2024  EXAM: XR CHEST PORTABLE HISTORY: Chest Pain.  TECHNIQUE: Frontal chest. COMPARISON: 11/23/2006 FINDINGS: There is a shallow inspiration. The cardiac silhouette, mediastinum, and pulmonary vasculature are within normal limits. There is no consolidation, pleural effusion, or pneumothorax. Diffuse interstitial lung markings are appreciated and are possibly chronic. No

## 2024-11-15 NOTE — PROGRESS NOTES
ACUTE OCCUPATIONAL THERAPY GOALS:   (Developed with and agreed upon by patient and/or caregiver.)  1. Pt will complete functional mobility for ADLs with SBA  2. Pt will complete grooming and hygiene at sink independently  3. Pt will tolerate 23 minutes functional activity with min or fewer rest breaks to promote increased endurance for ADLs    Goals met      OCCUPATIONAL THERAPY Initial Assessment, Daily Note, and Discharge       OT Visit Days: 1  Acknowledge Orders  Time  OT Charge Capture  Rehab Caseload Tracker      Melissa Elliott is a 89 y.o. female   PRIMARY DIAGNOSIS: Chest pain  Acute coronary syndrome (HCC) [I24.9]  ACS (acute coronary syndrome) (HCC) [I24.9]  Elevated troponin [R79.89]  Syncope, unspecified syncope type [R55]       Reason for Referral: Generalized Muscle Weakness (M62.81)  Observation: Payor: [employer] MEDICARE / Plan: [employer] CHOICE-PPO MEDICARE / Product Type: *No Product type* /     ASSESSMENT:     REHAB RECOMMENDATIONS:   Recommendation to date pending progress:  Setting:  No further skilled occupational therapy after discharge from hospital    Equipment:    None     ASSESSMENT:  Ms. Elliott was admitted with chest pain and SOB, workup negative for NSTEMI. Pt is a very remarkable 89 year old who lives alone (son is temporarily staying) and is fully independent and drives. Pt remains independent with ADLs, SBA for functional mobility w/ HHA. Pt endorsed mild fatigue with activity. Pt did not demonstrate ADL deficits and does not require further skilled OT services at this time, no d/c needs.      [facility] AM-PAC™ “6 Clicks” Daily Activity Inpatient Short Form:    AM-PAC Daily Activity - Inpatient   How much help is needed for putting on and taking off regular lower body clothing?: None  How much help is needed for bathing (which includes washing, rinsing, drying)?: None  How much help is needed for toileting (which includes using toilet, bedpan, or urinal)?: None  How  PTA    EDUCATION:  Education Given To: Patient  Education Provided: Role of Therapy;Plan of Care    TOTAL TREATMENT DURATION AND TIME:  Time In: 0925  Time Out: 0955  Minutes: 30    Jennifer Long OT

## 2024-11-15 NOTE — PLAN OF CARE
Problem: Discharge Planning  Goal: Discharge to home or other facility with appropriate resources  Outcome: Progressing  Flowsheets  Taken 11/15/2024 0826 by Anca Warren RN  Discharge to home or other facility with appropriate resources:   Identify barriers to discharge with patient and caregiver   Arrange for needed discharge resources and transportation as appropriate   Identify discharge learning needs (meds, wound care, etc)   Arrange for interpreters to assist at discharge as needed   Refer to discharge planning if patient needs post-hospital services based on physician order or complex needs related to functional status, cognitive ability or social support system  Taken 11/15/2024 0024 by Kaitlynn Buitrago RN  Discharge to home or other facility with appropriate resources: Identify barriers to discharge with patient and caregiver  Taken 11/15/2024 0000 by Kaitlynn Buitrago RN  Discharge to home or other facility with appropriate resources: Identify barriers to discharge with patient and caregiver     Problem: Pain  Goal: Verbalizes/displays adequate comfort level or baseline comfort level  Outcome: Progressing  Flowsheets  Taken 11/15/2024 0826 by Anca Warren RN  Verbalizes/displays adequate comfort level or baseline comfort level:   Encourage patient to monitor pain and request assistance   Assess pain using appropriate pain scale   Administer analgesics based on type and severity of pain and evaluate response   Implement non-pharmacological measures as appropriate and evaluate response   Consider cultural and social influences on pain and pain management   Notify Licensed Independent Practitioner if interventions unsuccessful or patient reports new pain  Taken 11/15/2024 0615 by Kaitlynn Buitrago RN  Verbalizes/displays adequate comfort level or baseline comfort level: Encourage patient to monitor pain and request assistance  Taken 11/15/2024 0202 by Kaitlynn Buitrago RN  Verbalizes/displays

## 2024-11-15 NOTE — PROGRESS NOTES
Spiritual Health History and Assessment/Progress Note  [facility]    (P) Initial Encounter,  ,  ,      Name: Melissa Elliott MRN: [id number]    Age: 89 y.o.     Sex: female   Language: English   Gnosticist: Congregation   Chest pain     Date: 11/15/2024            Total Time Calculated: (P) 20 min              Spiritual Assessment began in D 4 TELEMETRY        Referral/Consult From: (P) Rounding   Encounter Overview/Reason: (P) Initial Encounter  Service Provided For: (P) Patient and family together    Janelle, Belief, Meaning:   Patient identifies as spiritual, is connected with a janelle tradition or spiritual practice, and has beliefs or practices that help with coping during difficult times  Family/Friends identify as spiritual and have beliefs or practices that help with coping during difficult times      Importance and Influence:  Patient has no beliefs influential to healthcare decision-making identified during this visit  Family/Friends have no beliefs influential to healthcare decision-making identified during this visit    Community:  Patient is connected with a spiritual community  Family/Friends are connected with a spiritual community:    Assessment and Plan of Care:     Patient Interventions include: Facilitated expression of thoughts and feelings, Engaged in theological reflection, Affirmed coping skills/support systems, and Provided sacramental/Roman Catholic ritual  Family/Friends Interventions include: Facilitated expression of thoughts and feelings, Engaged in theological reflection, Affirmed coping skills/support systems, and Provided sacramental/Roman Catholic ritual    Patient Plan of Care: Spiritual Care available upon further referral  Family/Friends Plan of Care: Spiritual Care available upon further referral    Electronically signed by BRANDON PEÑA on 11/15/2024 at 3:00 PM

## 2024-11-15 NOTE — ED TRIAGE NOTES
Pt to ED via ems with c/o chest tightness, shortness of breath, and pain to back of neck. Per ems pt was at home with son when son states her eyes rolled back in her head and he thought she was dying. Pt c/o chest tightness. Pt denies radiation of pain. Pt states started with pain to back of neck x2 days ago. Pt denies cough or congestion. Pt denies nausea vomiting or diarrhea. Pt states some chills. Pt states son has upper respiratory sxs at home. Pt denies known fever. Pt noted to be anxious. Pt alert and able to answer all questions at this time.

## 2024-11-15 NOTE — CONSULTS
Palliative Care    Patient: Melissa Elliott MRN: [id number]  SSN: [id number]    YOB: 1929  Age: 89 y.o.  Sex: female       Date of Request: 11/14/2024  Date of Consult:  11/15/2024  Reason for Consult:  goals of care  Requesting Physician: Dr Calvo      Assessment/Plan:     Principal Diagnosis:    Fatigue, Lethargy  R53.83    Additional Diagnoses:   Advance Care Planning Counseling Z71.89  Frailty  R54  Encounter for Palliative Care  Z51.5    Palliative Performance Scale (PPS):       Medical Decision Making:   Reviewed and summarized notes from admission to present   Discussed case with appropriate providers  Reviewed laboratory and x-ray data from admission to present     Pt resting in recliner, no distress noted.  No family at bedside.  Introduced role of PC and reviewed events.  Pt reports she is feeling much better today, and voiced appreciation for care she has received.  Pt denies current chest pain or dyspnea. Pt reports she has been healthy her whole life, and she feels this has helped her live to 89 years old.  She hopes to live several more years, and appears healthy enough for this.     In addition to E&M time spent above, an additional 16 minutes was spent on ACP.  Pt reports her son his her HCPOA.  Her daughter will be bringing a copy of the HCPOA today.  We reviewed her wishes regarding life support and resuscitation in the event of arrest.  Pt states she does not want any heroics, and requests DNR status.  DNR order placed per her request.      Will discuss findings with members of the interdisciplinary team.      Thank you for this referral.          .    Subjective:     History obtained from:  Patient, Care Provider, and Chart    Chief Complaint: Chest pain  History of Present Illness:  Ms Campbell is a 89 yo female with PMH of CAD s/p stent placement, and hypothyroidism, who presented to the ER from home on 11/14/2024 with c/o chest tightness and presyncopal event.  Pt reported  associated nausea.  PT denied fevers, vomiting, dyspnea.  Work up in the ER was notable for elevated troponin, and elevated Cr.  EKG was negative for STEMI.  Pt was admitted for further management.  Pt was evaluated by Cardiology, who felt this was not acute coronary syndrome, and recommended conservative management.  Pt reports she feels better this morning.      Advance Directive: Yes       Code Status:  Full Code            Health Care Power of : Yes - Patient states she has a Health Care Power of ; family member to bring copy.    History reviewed. No pertinent past medical history.   History reviewed. No pertinent surgical history.  History reviewed. No pertinent family history.   Social History     Tobacco Use    Smoking status: Never    Smokeless tobacco: Never   Substance Use Topics    Alcohol use: Yes     Prior to Admission medications    Medication Sig Start Date End Date Taking? Authorizing Provider   aspirin 81 MG chewable tablet Take 1 tablet by mouth daily   Yes Haris Agarwal MD   calcium carbonate (OSCAL) 500 MG TABS tablet Take 1 tablet by mouth daily   Yes Haris Agarwal MD   vitamin D 50 MCG (2000 UT) CAPS capsule Take 1 capsule by mouth daily   Yes Haris Agarwal MD   atorvastatin (LIPITOR) 20 MG tablet Take 0.5 tablets by mouth   Yes Haris Agarwal MD   estradiol (ESTRACE) 0.1 MG/GM vaginal cream Place 1 g vaginally 10/9/23  Yes Haris Agarwal MD   levothyroxine (SYNTHROID) 50 MCG tablet Take 1 tablet by mouth daily 11/9/23  Yes Haris Agarwal MD   predniSONE (DELTASONE) 1 MG tablet TAKE 1 TABLET (1 MG) BY MOUTH DAILY TAKE 1 TO 2 TABLETS BY MOUTH WITH FOOD FOR ARTHRITIS 11/9/23  Yes Haris Agarwal MD       Allergies   Allergen Reactions    Sulfa Antibiotics         Review of Systems:  A comprehensive review of systems was negative except for:   Constitutional: Positive for fatigue.     Objective:     Visit Vitals  BP (!) 135/47

## 2024-11-15 NOTE — PROGRESS NOTES
4 Eyes Skin Assessment     NAME:  Melissa Elliott  YOB: 1929  MEDICAL RECORD NUMBER:  [id number]    The patient is being assessed for  Admission    I agree that at least one RN has performed a thorough Head to Toe Skin Assessment on the patient. ALL assessment sites listed below have been assessed.      Areas assessed by both nurses:    Head, Face, Ears, Shoulders, Back, Chest, Arms, Elbows, Hands, Sacrum. Buttock, Coccyx, Ischium, and Legs. Feet and Heels        Does the Patient have a Wound? No noted wound(s)       Hira Prevention initiated by RN: No  Wound Care Orders initiated by RN: No    Pressure Injury (Stage 3,4, Unstageable, DTI, NWPT, and Complex wounds) if present, place Wound referral order by RN under : No    New Ostomies, if present place, Ostomy referral order under : No     Nurse 1 eSignature: Electronically signed by HARITHA KOHLER RN on 11/15/24 at 1:47 AM EST    **SHARE this note so that the co-signing nurse can place an eSignature**    Nurse 2 eSignature: Electronically signed by Leif Carlin RN on 11/15/24 at 1:48 AM EST

## 2024-11-15 NOTE — CARE COORDINATION
11/15/24 1432   Service Assessment   Patient Orientation Alert and Oriented   Cognition Alert   History Provided By Patient   Primary Caregiver Self   Accompanied By/Relationship Daughter   Support Systems Children;Family Members;Friends/Neighbors   Patient's Healthcare Decision Maker is: Legal Next of Kin   PCP Verified by CM Yes   Last Visit to PCP Within last 3 months   Prior Functional Level Independent in ADLs/IADLs   Current Functional Level Independent in ADLs/IADLs   Can patient return to prior living arrangement Yes   Ability to make needs known: Good   Family able to assist with home care needs: Yes   Would you like for me to discuss the discharge plan with any other family members/significant others, and if so, who? Yes  (Family)   Financial Resources Medicare   Community Resources None   CM/SW Referral Other (see comment)  (N/A)   Social/Functional History   Lives With Alone   Home Equipment Cane   ADL Assistance Independent   Ambulation Assistance Independent   Transfer Assistance Independent   Active  Yes   Mode of Transportation Car   Occupation Retired   Discharge Planning   Type of Residence House   Living Arrangements Alone;Children   Current Services Prior To Admission None   Potential Assistance Needed N/A   DME Ordered? No   Potential Assistance Purchasing Medications No   Type of Home Care Services None   Patient expects to be discharged to: House   Services At/After Discharge   Services At/After Discharge None   [place] Resource Information Provided? No   Mode of Transport at Discharge Other (see comment)  (Car)   Confirm Follow Up Transport Family   Condition of Participation: Discharge Planning   The Plan for Transition of Care is related to the following treatment goals: Home with family and neighbor assistance   The Patient and/Or Patient Representative agree with the Discharge Plan? Yes     PT recommended home health. Patient and daughter decline therapy. No DME

## 2024-11-15 NOTE — PROGRESS NOTES
rheumatica    History of giant cell arteritis    Encounter for palliative care    Fatigue    ACP (advance care planning)  Resolved Problems:    * No resolved hospital problems. *      Objective:   Patient Vitals for the past 24 hrs:   Temp Pulse Resp BP SpO2   11/15/24 1107 97.7 °F (36.5 °C) 56 18 (!) 139/39 100 %   11/15/24 0807 97.5 °F (36.4 °C) 54 16 (!) 135/47 100 %   11/15/24 0421 97.9 °F (36.6 °C) 57 18 (!) 130/43 96 %   11/14/24 2352 97.5 °F (36.4 °C) 62 18 (!) 166/48 100 %   11/14/24 2241 -- 69 14 -- 100 %   11/14/24 2226 -- 70 19 135/67 97 %   11/14/24 2211 -- 69 (!) 31 (!) 133/49 99 %   11/14/24 2156 -- 69 17 (!) 130/53 98 %   11/14/24 2141 -- 64 21 (!) 140/75 100 %   11/14/24 2126 -- 66 15 (!) 153/55 100 %   11/14/24 2115 -- 72 27 -- --   11/14/24 2111 -- 69 24 (!) 168/63 98 %   11/14/24 2101 97.5 °F (36.4 °C) 63 (!) 31 (!) 153/60 98 %   11/14/24 2100 -- 70 24 (!) 153/60 95 %       Oxygen Therapy  SpO2: 100 %  Pulse Oximetry Type: Continuous  Pulse via Oximetry: 70 beats per minute  Pulse Oximeter Device Mode: Continuous  O2 Device: None (Room air)    Estimated body mass index is 18.97 kg/m² as calculated from the following:    Height as of this encounter: 1.626 m (5' 4\").    Weight as of this encounter: 50.1 kg (110 lb 8 oz).    Intake/Output Summary (Last 24 hours) at 11/15/2024 1616  Last data filed at 11/15/2024 1445  Gross per 24 hour   Intake 380.98 ml   Output 200 ml   Net 180.98 ml         Physical Exam:   General:    Well nourished.    Head:  Normocephalic, atraumatic  Eyes:  Sclerae appear normal.  Pupils equally round.  ENT:  Nares appear normal.  Moist oral mucosa  Neck:  No restricted ROM.  Trachea midline   CV:   RRR.  No jugular venous distension.  Lungs:   CTAB.  No wheezing, rhonchi, or rales.  Symmetric expansion.  Abdomen:   Soft, nontender, nondistended.  Extremities: No cyanosis or clubbing.  No edema  Skin:     No rashes.  Normal coloration.   Warm and dry.    Neuro:  CN II-XII  1.0 m/s    AV Mean Gradient 5 mmHg    AV Mean Gradient 5 mmHg    AV VTI 39.4 cm    AV Area by VTI 1.8 cm2    AV Area by Peak Velocity 1.9 cm2    Aortic Root 3.2 cm    Ascending Aorta 3.1 cm    IVC Proxmal 1.8 cm    MV E Wave Deceleration Time 330.0 ms    MV A Velocity 1.54 m/s    MV E Velocity 0.86 m/s    MV Mean Gradient 3 mmHg    MV VTI 44.4 cm    MV Mean Velocity 0.7 m/s    MV Max Velocity 1.6 m/s    MV Peak Gradient 10 mmHg    MV Area by VTI 1.6 cm2    PV .0 ms    PV Max Velocity 0.8 m/s    PV Peak Gradient 3 mmHg    Est. RA Pressure 8 mmHg    RVIDd 2.5 cm    RV Basal Dimension 3.3 cm    RV Free Wall Peak S' 12.4 cm/s    TAPSE 1.6 (A) 1.7 cm    TR Max Velocity 2.59 m/s    TR Peak Gradient 27 mmHg    Body Surface Area 1.5 m2    Fractional Shortening 2D 27 28 - 44 %    LV ESV Index A4C 17 mL/m2    LV EDV Index A4C 47 mL/m2    LV ESV Index A2C 21 mL/m2    LV EDV Index A2C 43 mL/m2    LVIDd Index 3.22 cm/m2    LVIDs Index 2.37 cm/m2    LV RWT Ratio 0.33     LV Mass 2D 131.2 67 - 162 g    LV Mass 2D Index 86.3 43 - 95 g/m2    MV E/A 0.56     E/E' Ratio (Averaged) 11.82     E/E' Lateral 10.01     E/E' Septal 13.63     LA Volume Index BP 30 16 - 34 ml/m2    LVOT Stroke Volume Index 46.9 mL/m2    LA Volume Index MOD A2C 37 (A) 16 - 34 ml/m2    LA Volume Index MOD A4C 23 16 - 34 ml/m2    LA Size Index 1.97 cm/m2    LA/AO Root Ratio 0.94     Ao Root Index 2.11 cm/m2    Ascending Aorta Index 2.04 cm/m2    AV Velocity Ratio 0.73     LVOT:AV VTI Index 0.71     MELYSSA/BSA VTI 1.2 cm2/m2    MELYSSA/BSA Peak Velocity 1.3 cm2/m2    MV:LVOT VTI Index 1.59     RVSP 35 mmHg   Anti-Xa, Unfractionated Heparin    Collection Time: 11/15/24 12:39 PM   Result Value Ref Range    Anti-XA Unfrac Heparin >1.1 (H) 0.3 - 0.7 IU/mL   Urinalysis with Reflex to Culture    Collection Time: 11/15/24 12:53 PM    Specimen: Urine   Result Value Ref Range    Color, UA YELLOW/STRAW      Appearance CLOUDY      Specific Gravity, UA 1.018 1.001 - 1.023

## 2024-11-15 NOTE — PROGRESS NOTES
ACUTE PHYSICAL THERAPY GOALS:   (Developed with and agreed upon by patient and/or caregiver.)  LTG:  (1.)Ms. Elliott will move from supine to sit and sit to supine, scoot up and down and roll side to side in bed with INDEPENDENT within 7 day(s).   (2.)Ms. Elliott will transfer from bed to chair and chair to bed with INDEPENDENT using the least restrictive device within 7 day(s).   (3.)Ms. Elliott will ambulate with modified INDEPENDENCE for 250+ feet with the least restrictive device within 7 day(s).  (4.)Ms. Elliott will perform standing static and dynamic balance activities x 8 minutes with SUPERVISION to improve safety within 7 day(s).   (5.)Ms. Elliott will ascend and descend 4 stairs using one hand rail(s) with SUPERVISION to improve functional mobility and safety within 7 day(s).         PHYSICAL THERAPY Initial Assessment and Daily Note  (Link to Caseload Tracking: PT Visit Days : 1  Acknowledge Orders  Time In/Out  PT Charge Capture  Rehab Caseload Tracker    Melissa Elliott is a 89 y.o. female   PRIMARY DIAGNOSIS: Chest pain  Acute coronary syndrome (HCC) [I24.9]  ACS (acute coronary syndrome) (HCC) [I24.9]  Elevated troponin [R79.89]  Syncope, unspecified syncope type [R55]       Reason for Referral: Other abnormalities of gait and mobility (R26.89)  History of falling (Z91.81)  Observation: Payor: [employer] MEDICARE / Plan: [employer] CHOICE-PPO MEDICARE / Product Type: *No Product type* /     ASSESSMENT:     REHAB RECOMMENDATIONS:   Recommendation to date pending progress:  Setting:  Home Health Therapy    Equipment:    To Be Determined     ASSESSMENT:  Ms. Elliott lives alone, she is independent in her home and uses a cane in the community.  She admits to one fall recently.   Patient admitted with presyncopal episode.  She was given SBA to CGA for transfers and min HHA for ambulation in hallway.. Patient has declined in functional mobility.  Ms. Elliott would benefit from skilled physical

## 2024-11-15 NOTE — PROGRESS NOTES
TRANSFER - IN REPORT:    Verbal report received from Guerita JOHANSEN RN on Melissa Elliott being received from  ED ([facility]) for routine progression of care.     Report consisted of patient’s Situation, Background, Assessment and Recommendations(SBAR).     Information from the following report(s) SBAR was reviewed. Opportunity for questions and clarification was provided.      Assessment completed upon patient’s arrival to unit and care assumed.     Patient received to room 437. Patient connected to monitor and assessment completed. Plan of care reviewed. Patient oriented to room and call light. Patient aware to use call light to communicate any chest pain or needs.

## 2024-11-15 NOTE — CONSULTS
[facility] Cardiology Initial Cardiac Evaluation                 Date of  Admission: 11/14/2024  8:56 PM     Primary Care Physician: Sharan Santiago MD  Primary Cardiologist: CCC  Referring Physician: Dr Calvo  Attending Physician: Dr Person    CC: CP      Melissa Elliott is a 89 y.o. female admitted for Acute coronary syndrome (HCC) [I24.9].  She has a h/o DJD, htn, and CAD w PCI to the RCA in 2011. Presented to the ER 11/14/24 w JO ANN.  She lives independently, has had no cardiac complaints recently, active, taking meds as prescribed, but three days ago began having more neck pain radiating to her shoulders.  This continued.  Tonight after eating dinner she was walking to her chair when she had sudden light headed sensation w profuse nausea. She would have lost consciousness if she had not reached her chair.  Her eyes rolled back and she did not respond to her son. She had mild chest discomfort at that time but it resolved quickly, was very mild, did not radiate. She did not take any medicines for this episode. No recurrent symptoms. No recent palpitations.  No dizziness any other time even w standing. Mild chronic SOB which is unchanged.     In ER cr 1.4, HS trop 202, albumin 3.6, CBC wnl, CXR chronic diffuse interstitial markings, EKG NSR w rate 62 without ST changes. /60.  Admitted by hospitalist, started on heparin.     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 No pertinent past medical history.   History reviewed. No pertinent surgical history.  Allergies   Allergen Reactions    Sulfa Antibiotics       History reviewed. No pertinent family history.   Social History     Tobacco Use    Smoking status: Never    Smokeless tobacco: Never   Substance Use Topics

## 2024-11-15 NOTE — ED PROVIDER NOTES
plan.    Complexity of Problems Addressed:  1 or more acute illnesses that pose a threat to life or bodily function.     Data Reviewed and Analyzed:    Category 1:   I ordered each unique test.  I reviewed the results of each unique test.  The patients assessment required an independent historian: Patient family.  The reason they were needed is  an altered level of consciousness and important historical information not provided by the patient.  (Altered at time of event)    Category 2:   My Independent EKG Interpretation:   Sinus rhythm, rate of 62, no ST elevation MI, QTc is 441 and is not prolonged, QRS is 105 and is not widened    Radiology:  My limited/focused independent chest x-ray review shows no pneumothorax, no pneumonia, no cardiomegaly, no aortic widening, no noted focal consolidation.  Suspected chronic interstitial changes  Please see official radiology read for additional information, further findings and official interpretation.     Category 3: Discussion of management or test interpretation.  See MDM / clinical course section above for details    Risk of Complications and/or Morbidity of Patient Management:  Over the counter drug management performed.  Prescription drug management performed.  Drug therapy given requiring intensive monitoring for toxicity.  Chronic medical problems impacting care include CAD, history of multiple stent placed.  Shared medical decision making was utilized in creating the patients health plan today.  The patient was admitted and I have discussed patient management with the admitting provider.  The management of this patient was discussed with an external consultant.    Critical care procedure note : 35 minutes of critical care time was performed in the emergency department. This was separate from any other procedures listed during the patients emergency department course. The failure to initiate these interventions on an urgent basis would likely have resulted in sudden,  distress   Eyes: Anicteric, conjunctiva pink, PERRLA, EOMI  ENT: No nasal discharge, no gross nasal congestion present  Pulmonary: Clear to auscultation bilaterally with symmetric chest rise, no increased work of breathing, no accessory muscle use  Cardiovascular: Regular rate and rhythm, no rub or gallop appreciated on my exam  GI: Abdomen is soft, nontender, nondistended  Musculoskeletal: No obvious joint deformity or joint effusion, normal joint range of motion  Neuro: Cranial nerves II through VII grossly intact, strength and sensation is grossly intact in the upper and lower extremities bilaterally  Skin: Skin is warm and dry    Procedures  Results for orders placed or performed during the hospital encounter of 11/14/24   XR CHEST PORTABLE    Narrative    EXAM: XR CHEST PORTABLE    HISTORY: Chest Pain.      TECHNIQUE: Frontal chest.    COMPARISON: 11/23/2006    FINDINGS:   There is a shallow inspiration.    The cardiac silhouette, mediastinum, and pulmonary vasculature are within normal  limits.    There is no consolidation, pleural effusion, or pneumothorax. Diffuse  interstitial lung markings are appreciated and are possibly chronic.    No significant osseous abnormalities are observed. Severe degenerative changes  noted in the right shoulder.      Impression    No evidence of an acute intrathoracic process.    Diffuse interstitial lung markings are appreciated and are possibly chronic.        Electronically signed by KAMRAN ISSA   CBC with Auto Differential   Result Value Ref Range    WBC 8.0 4.3 - 11.1 K/uL    RBC 4.49 4.05 - 5.2 M/uL    Hemoglobin 13.7 11.7 - 15.4 g/dL    Hematocrit 41.5 35.8 - 46.3 %    MCV 92.4 82 - 102 FL    MCH 30.5 26.1 - 32.9 PG    MCHC 33.0 31.4 - 35.0 g/dL    RDW 13.2 11.9 - 14.6 %    Platelets 220 150 - 450 K/uL    MPV 12.3 9.4 - 12.3 FL    nRBC 0.00 0.0 - 0.2 K/uL    Differential Type AUTOMATED      Neutrophils % 76 43 - 78 %    Lymphocytes % 14 13 - 44 %    Monocytes % 8 4.0

## 2024-11-16 VITALS
TEMPERATURE: 97.7 F | BODY MASS INDEX: 18.86 KG/M2 | HEART RATE: 70 BPM | WEIGHT: 110.5 LBS | RESPIRATION RATE: 15 BRPM | OXYGEN SATURATION: 100 % | DIASTOLIC BLOOD PRESSURE: 50 MMHG | HEIGHT: 64 IN | SYSTOLIC BLOOD PRESSURE: 153 MMHG

## 2024-11-16 LAB
ALBUMIN SERPL-MCNC: 2.7 G/DL (ref 3.2–4.6)
ALBUMIN/GLOB SERPL: 0.8 (ref 1–1.9)
ALP SERPL-CCNC: 79 U/L (ref 35–104)
ALT SERPL-CCNC: 8 U/L (ref 8–45)
ANION GAP SERPL CALC-SCNC: 9 MMOL/L (ref 7–16)
AST SERPL-CCNC: 20 U/L (ref 15–37)
BASOPHILS # BLD: 0 K/UL (ref 0–0.2)
BASOPHILS NFR BLD: 0 % (ref 0–2)
BILIRUB SERPL-MCNC: <0.2 MG/DL (ref 0–1.2)
BUN SERPL-MCNC: 31 MG/DL (ref 8–23)
CALCIUM SERPL-MCNC: 8.9 MG/DL (ref 8.8–10.2)
CHLORIDE SERPL-SCNC: 111 MMOL/L (ref 98–107)
CO2 SERPL-SCNC: 21 MMOL/L (ref 20–29)
CREAT SERPL-MCNC: 1.31 MG/DL (ref 0.6–1.1)
DIFFERENTIAL METHOD BLD: ABNORMAL
EOSINOPHIL # BLD: 0.2 K/UL (ref 0–0.8)
EOSINOPHIL NFR BLD: 5 % (ref 0.5–7.8)
ERYTHROCYTE [DISTWIDTH] IN BLOOD BY AUTOMATED COUNT: 13 % (ref 11.9–14.6)
GLOBULIN SER CALC-MCNC: 3.2 G/DL (ref 2.3–3.5)
GLUCOSE SERPL-MCNC: 93 MG/DL (ref 70–99)
HCT VFR BLD AUTO: 36.8 % (ref 35.8–46.3)
HGB BLD-MCNC: 11.7 G/DL (ref 11.7–15.4)
IMM GRANULOCYTES # BLD AUTO: 0 K/UL (ref 0–0.5)
IMM GRANULOCYTES NFR BLD AUTO: 0 % (ref 0–5)
LYMPHOCYTES # BLD: 1.5 K/UL (ref 0.5–4.6)
LYMPHOCYTES NFR BLD: 29 % (ref 13–44)
MCH RBC QN AUTO: 30.1 PG (ref 26.1–32.9)
MCHC RBC AUTO-ENTMCNC: 31.8 G/DL (ref 31.4–35)
MCV RBC AUTO: 94.6 FL (ref 82–102)
MONOCYTES # BLD: 0.4 K/UL (ref 0.1–1.3)
MONOCYTES NFR BLD: 8 % (ref 4–12)
NEUTS SEG # BLD: 3 K/UL (ref 1.7–8.2)
NEUTS SEG NFR BLD: 58 % (ref 43–78)
NRBC # BLD: 0 K/UL (ref 0–0.2)
PLATELET # BLD AUTO: 167 K/UL (ref 150–450)
PMV BLD AUTO: 12 FL (ref 9.4–12.3)
POTASSIUM SERPL-SCNC: 4.4 MMOL/L (ref 3.5–5.1)
PROT SERPL-MCNC: 5.8 G/DL (ref 6.3–8.2)
RBC # BLD AUTO: 3.89 M/UL (ref 4.05–5.2)
SODIUM SERPL-SCNC: 142 MMOL/L (ref 136–145)
WBC # BLD AUTO: 5.2 K/UL (ref 4.3–11.1)

## 2024-11-16 PROCEDURE — 6370000000 HC RX 637 (ALT 250 FOR IP): Performed by: STUDENT IN AN ORGANIZED HEALTH CARE EDUCATION/TRAINING PROGRAM

## 2024-11-16 PROCEDURE — 36415 COLL VENOUS BLD VENIPUNCTURE: CPT

## 2024-11-16 PROCEDURE — 85025 COMPLETE CBC W/AUTO DIFF WBC: CPT

## 2024-11-16 PROCEDURE — 2580000003 HC RX 258: Performed by: STUDENT IN AN ORGANIZED HEALTH CARE EDUCATION/TRAINING PROGRAM

## 2024-11-16 PROCEDURE — G0378 HOSPITAL OBSERVATION PER HR: HCPCS

## 2024-11-16 PROCEDURE — 80053 COMPREHEN METABOLIC PANEL: CPT

## 2024-11-16 RX ADMIN — LEVOTHYROXINE SODIUM 50 MCG: 0.05 TABLET ORAL at 06:22

## 2024-11-16 RX ADMIN — SODIUM CHLORIDE, PRESERVATIVE FREE 10 ML: 5 INJECTION INTRAVENOUS at 08:42

## 2024-11-16 RX ADMIN — PREDNISONE 1 MG: 1 TABLET ORAL at 08:41

## 2024-11-16 NOTE — PROGRESS NOTES
Discharge instructions were reviewed with patient and pt's son. An opportunity was given for questions. All medications were reviewed, and information was given on the new medications. Patient verbalized understanding, and has no questions at this time.    Heart monitor and Ivs removed.

## 2024-11-16 NOTE — PLAN OF CARE
Problem: Discharge Planning  Goal: Discharge to home or other facility with appropriate resources  Outcome: Progressing     Problem: Pain  Goal: Verbalizes/displays adequate comfort level or baseline comfort level  Outcome: Progressing  Flowsheets  Taken 11/16/2024 0620 by Kaitlynn Buitrago, RN  Verbalizes/displays adequate comfort level or baseline comfort level: Encourage patient to monitor pain and request assistance  Taken 11/16/2024 0445 by Kaitlynn Buitrago, RN  Verbalizes/displays adequate comfort level or baseline comfort level: Encourage patient to monitor pain and request assistance  Taken 11/16/2024 0220 by Kaitlynn Buitrago, RN  Verbalizes/displays adequate comfort level or baseline comfort level: Encourage patient to monitor pain and request assistance  Taken 11/16/2024 0059 by Kaitlynn Buitrago, RN  Verbalizes/displays adequate comfort level or baseline comfort level: Encourage patient to monitor pain and request assistance  Taken 11/15/2024 2258 by Kaitlynn Buitrago, RN  Verbalizes/displays adequate comfort level or baseline comfort level: Encourage patient to monitor pain and request assistance  Taken 11/15/2024 2045 by Kaitlynn Buitrago, RN  Verbalizes/displays adequate comfort level or baseline comfort level: Encourage patient to monitor pain and request assistance     Problem: Safety - Adult  Goal: Free from fall injury  Outcome: Progressing     Problem: ABCDS Injury Assessment  Goal: Absence of physical injury  Outcome: Progressing

## 2024-11-16 NOTE — DISCHARGE INSTRUCTIONS
Continue medications as prescribed.  Stay hydrated.  Follow-up with PCP and Cardiology in a few weeks.

## 2024-11-16 NOTE — PROGRESS NOTES
[facility] CARDIOLOGY PROGRESS NOTE           11/16/2024 10:31 AM    Admit Date: 11/14/2024         Subjective: Echo reviewed and showed no RWA.  PT continue to deny CP.  No further cardiac w/u is recommended.    ROS:  Cardiovascular:  As noted above    Objective:      Vitals:    11/15/24 1947 11/15/24 2335 11/16/24 0346 11/16/24 0754   BP: (!) 131/49 (!) 163/62 (!) 127/52 (!) 153/50   Pulse: 57 67 59 70   Resp: 16 16 16 15   Temp: 97.7 °F (36.5 °C) 97.7 °F (36.5 °C) 97.7 °F (36.5 °C) 97.7 °F (36.5 °C)   TempSrc: Oral Oral Oral Oral   SpO2: 99% 97% 96% 100%   Weight:       Height:         Physical Exam:  General: Well Developed, Well Nourished, No Acute Distress, Alert & Oriented x 3, Appropriate mood  Neck: supple, no JVD  Heart: S1S2 with RRR without murmurs or gallops  Lungs: Clear throughout auscultation bilaterally without adventitious sounds  Abd: soft, nontender, nondistended, with good bowel sounds  Ext: no edema bilaterally  Skin: warm and dry      Data Review:   Recent Labs     11/14/24  2222 11/15/24  0402 11/16/24  0456   NA  --  141 142   K  --  4.5 4.4   BUN  --  25* 31*   WBC  --  5.7 5.2   HGB  --  11.5* 11.7   HCT  --  34.7* 36.8   PLT  --  174 167   INR 1.0  --   --        No results for input(s): \"TNIPOC\" in the last 72 hours.    Invalid input(s): \"TROIQ\"        Assessment/Plan:     Principal Problem:    Chest pain  Active Problems:    Elevated troponin    Pre-syncope    CAD (coronary artery disease)    S/P coronary artery stent placement    Elevated serum creatinine    CKD (chronic kidney disease)    Hypothyroidism    Osteoarthritis    Chronic neck pain    Osteoporosis    History of polymyalgia rheumatica    History of giant cell arteritis    Encounter for palliative care    Fatigue    ACP (advance care planning)  Resolved Problems:    * No resolved hospital problems. *    A/P  1) Elevated cardiac biomarkers - echo reviewed, no ACS follow up with primary cardiologist as out

## 2024-11-16 NOTE — PLAN OF CARE
Problem: Discharge Planning  Goal: Discharge to home or other facility with appropriate resources  11/16/2024 1021 by Winnie Carson RN  Outcome: Adequate for Discharge  11/16/2024 0955 by Patrizia Rivera RN  Outcome: Progressing     Problem: Pain  Goal: Verbalizes/displays adequate comfort level or baseline comfort level  11/16/2024 1021 by Winnie Carson RN  Outcome: Adequate for Discharge  11/16/2024 0955 by Patrizia Rivera RN  Outcome: Progressing  Flowsheets  Taken 11/16/2024 0620 by Kaitlynn Buitrago, RN  Verbalizes/displays adequate comfort level or baseline comfort level: Encourage patient to monitor pain and request assistance  Taken 11/16/2024 0445 by Kaitlynn Buitrago, RN  Verbalizes/displays adequate comfort level or baseline comfort level: Encourage patient to monitor pain and request assistance  Taken 11/16/2024 0220 by Kaitlynn Buitrago, RN  Verbalizes/displays adequate comfort level or baseline comfort level: Encourage patient to monitor pain and request assistance  Taken 11/16/2024 0059 by Kaitlynn Buitrago, RN  Verbalizes/displays adequate comfort level or baseline comfort level: Encourage patient to monitor pain and request assistance  Taken 11/15/2024 2258 by Kaitlynn Buitrago, RN  Verbalizes/displays adequate comfort level or baseline comfort level: Encourage patient to monitor pain and request assistance  Taken 11/15/2024 2045 by Kaitlynn Buitrago, RN  Verbalizes/displays adequate comfort level or baseline comfort level: Encourage patient to monitor pain and request assistance     Problem: Safety - Adult  Goal: Free from fall injury  11/16/2024 1021 by Winnie Carson RN  Outcome: Adequate for Discharge  11/16/2024 0955 by Patrizia Rivera RN  Outcome: Progressing     Problem: ABCDS Injury Assessment  Goal: Absence of physical injury  11/16/2024 1021 by Winnie Carson RN  Outcome: Adequate for Discharge  11/16/2024 0955 by Patrizia Rivera RN  Outcome: Progressing

## 2024-11-16 NOTE — DISCHARGE SUMMARY
Hospitalist Discharge Summary   Admit Date:  2024  8:56 PM   DC Note date: 2024  Name:  Melissa Elliott   Age:  89 y.o.  Sex:  female  :  1929   MRN:  [id number]   Room:  [facility]  PCP:  Sharan Santiago MD    Presenting Complaint: Chest Pain and Shortness of Breath     Initial Admission Diagnosis: Acute coronary syndrome (HCC) [I24.9]  ACS (acute coronary syndrome) (HCC) [I24.9]  Elevated troponin [R79.89]  Syncope, unspecified syncope type [R55]     Problem List for this Hospitalization (present on admission):    Principal Problem:    Chest pain  Active Problems:    Elevated troponin    Pre-syncope    CAD (coronary artery disease)    S/P coronary artery stent placement    Elevated serum creatinine    CKD (chronic kidney disease)    Hypothyroidism    Osteoarthritis    Chronic neck pain    Osteoporosis    History of polymyalgia rheumatica    History of giant cell arteritis    Encounter for palliative care    Fatigue    ACP (advance care planning)  Resolved Problems:    * No resolved hospital problems. *      Hospital Course:  Mrs. Melissa Elliott is a 89 y.o. female with medical history of CAD s/p stent placement, and hypothyroidism, who presented with chest tightness.     On the day of admission, she developed chest tightness and presyncopal episode.  She fainted and then had to sit on a chair.  Per son, her eyes rolled back for a very short period of time, but she never lost any consciousness.  She did not fall.  She did not measure any temperature, but she felt hot on her head although she felt cold.  She had nausea, but no vomiting or diarrhea.  No sick contact but son has very mild congestion.  She has had ;ack of appetite for the past 2 days before admission, but was able to drink tea.  For her chest tightness, she did not take any nitroglycerin.  Son called EMS, and she came to ER for further evaluation.     In ER, vitals showed tachypnea RR 24-31.  CBC within normal    Result Value Ref Range    Anti-XA Unfrac Heparin >1.1 (H) 0.3 - 0.7 IU/mL   Urinalysis with Reflex to Culture    Collection Time: 11/15/24 12:53 PM    Specimen: Urine   Result Value Ref Range    Color, UA YELLOW/STRAW      Appearance CLOUDY      Specific Gravity, UA 1.018 1.001 - 1.023      pH, Urine 6.5 5.0 - 9.0      Protein, UA Negative NEG mg/dL    Glucose, Ur Negative NEG mg/dL    Ketones, Urine TRACE (A) NEG mg/dL    Bilirubin, Urine Negative NEG      Blood, Urine Negative NEG      Urobilinogen, Urine 1.0 0.2 - 1.0 EU/dL    Nitrite, Urine Negative NEG      Leukocyte Esterase, Urine SMALL (A) NEG      WBC, UA 0-3 0 /hpf    RBC, UA 5-10 0 /hpf    BACTERIA, URINE TRACE 0 /hpf    Urine Culture if Indicated CULTURE NOT INDICATED BY UA RESULT      Epithelial Cells, UA 0-3 0 /hpf    Mucus, UA 0 0 /lpf    Other observations RESULTS VERIFIED MANUALLY     Comprehensive Metabolic Panel    Collection Time: 11/16/24  4:56 AM   Result Value Ref Range    Sodium 142 136 - 145 mmol/L    Potassium 4.4 3.5 - 5.1 mmol/L    Chloride 111 (H) 98 - 107 mmol/L    CO2 21 20 - 29 mmol/L    Anion Gap 9 7 - 16 mmol/L    Glucose 93 70 - 99 mg/dL    BUN 31 (H) 8 - 23 MG/DL    Creatinine 1.31 (H) 0.60 - 1.10 MG/DL    Est, Glom Filt Rate 38 (L) >60 ml/min/1.73m2    Calcium 8.9 8.8 - 10.2 MG/DL    Total Bilirubin <0.2 0.0 - 1.2 MG/DL    ALT 8 8 - 45 U/L    AST 20 15 - 37 U/L    Alk Phosphatase 79 35 - 104 U/L    Total Protein 5.8 (L) 6.3 - 8.2 g/dL    Albumin 2.7 (L) 3.2 - 4.6 g/dL    Globulin 3.2 2.3 - 3.5 g/dL    Albumin/Globulin Ratio 0.8 (L) 1.0 - 1.9     CBC with Auto Differential    Collection Time: 11/16/24  4:56 AM   Result Value Ref Range    WBC 5.2 4.3 - 11.1 K/uL    RBC 3.89 (L) 4.05 - 5.2 M/uL    Hemoglobin 11.7 11.7 - 15.4 g/dL    Hematocrit 36.8 35.8 - 46.3 %    MCV 94.6 82 - 102 FL    MCH 30.1 26.1 - 32.9 PG    MCHC 31.8 31.4 - 35.0 g/dL    RDW 13.0 11.9 - 14.6 %    Platelets 167 150 - 450 K/uL    MPV 12.0 9.4 - 12.3 FL

## 2024-11-22 NOTE — ED NOTES
Attempt made with case management to contact pt post discharge to inquire about follow up care. Unable to reach pt. VM left.      Melissa Ortiz, RN  11/22/24 4832